# Patient Record
Sex: FEMALE | Race: NATIVE HAWAIIAN OR OTHER PACIFIC ISLANDER
[De-identification: names, ages, dates, MRNs, and addresses within clinical notes are randomized per-mention and may not be internally consistent; named-entity substitution may affect disease eponyms.]

---

## 2020-09-14 ENCOUNTER — HOSPITAL ENCOUNTER (OUTPATIENT)
Dept: HOSPITAL 67 - LAB | Age: 70
Discharge: HOME | End: 2020-09-14
Attending: NURSE PRACTITIONER
Payer: COMMERCIAL

## 2020-09-14 DIAGNOSIS — Z11.59: Primary | ICD-10-CM

## 2020-09-14 DIAGNOSIS — R05: ICD-10-CM

## 2020-09-14 DIAGNOSIS — J02.9: ICD-10-CM

## 2020-09-14 DIAGNOSIS — R53.83: ICD-10-CM

## 2020-09-14 PROCEDURE — 87635 SARS-COV-2 COVID-19 AMP PRB: CPT

## 2020-09-14 PROCEDURE — U0003 INFECTIOUS AGENT DETECTION BY NUCLEIC ACID (DNA OR RNA); SEVERE ACUTE RESPIRATORY SYNDROME CORONAVIRUS 2 (SARS-COV-2) (CORONAVIRUS DISEASE [COVID-19]), AMPLIFIED PROBE TECHNIQUE, MAKING USE OF HIGH THROUGHPUT TECHNOLOGIES AS DESCRIBED BY CMS-2020-01-R: HCPCS

## 2020-09-14 PROCEDURE — G2023 SPECIMEN COLLECT COVID-19: HCPCS

## 2021-02-22 ENCOUNTER — HOSPITAL ENCOUNTER (OUTPATIENT)
Dept: HOSPITAL 67 - MED/SURG | Age: 71
Setting detail: OBSERVATION
LOS: 3 days | Discharge: HOME | End: 2021-02-25
Attending: INTERNAL MEDICINE | Admitting: INTERNAL MEDICINE
Payer: COMMERCIAL

## 2021-02-22 VITALS
HEIGHT: 64 IN | BODY MASS INDEX: 24.11 KG/M2 | BODY MASS INDEX: 24.11 KG/M2 | WEIGHT: 141.25 LBS | HEIGHT: 64 IN | WEIGHT: 141.25 LBS

## 2021-02-22 VITALS — DIASTOLIC BLOOD PRESSURE: 109 MMHG | TEMPERATURE: 98 F | SYSTOLIC BLOOD PRESSURE: 213 MMHG

## 2021-02-22 VITALS — DIASTOLIC BLOOD PRESSURE: 82 MMHG | SYSTOLIC BLOOD PRESSURE: 176 MMHG | TEMPERATURE: 99.5 F

## 2021-02-22 VITALS — TEMPERATURE: 98.3 F | DIASTOLIC BLOOD PRESSURE: 98 MMHG | SYSTOLIC BLOOD PRESSURE: 192 MMHG

## 2021-02-22 DIAGNOSIS — F41.8: ICD-10-CM

## 2021-02-22 DIAGNOSIS — C55: ICD-10-CM

## 2021-02-22 DIAGNOSIS — K56.41: Primary | ICD-10-CM

## 2021-02-22 DIAGNOSIS — R10.84: ICD-10-CM

## 2021-02-22 DIAGNOSIS — G25.81: ICD-10-CM

## 2021-02-22 DIAGNOSIS — E11.9: ICD-10-CM

## 2021-02-22 DIAGNOSIS — R11.2: ICD-10-CM

## 2021-02-22 LAB
APTT BLD: 23.2 SECONDS (ref 24.5–33.6)
PLATELET # BLD: 108 K/UL (ref 152–353)
POTASSIUM SERPL-SCNC: 3 MMOL/L (ref 3.6–5.2)
SODIUM SERPL-SCNC: 136 MMOL/L (ref 136–145)

## 2021-02-22 PROCEDURE — 83690 ASSAY OF LIPASE: CPT

## 2021-02-22 PROCEDURE — 96366 THER/PROPH/DIAG IV INF ADDON: CPT

## 2021-02-22 PROCEDURE — 85027 COMPLETE CBC AUTOMATED: CPT

## 2021-02-22 PROCEDURE — 82948 REAGENT STRIP/BLOOD GLUCOSE: CPT

## 2021-02-22 PROCEDURE — 80048 BASIC METABOLIC PNL TOTAL CA: CPT

## 2021-02-22 PROCEDURE — 81000 URINALYSIS NONAUTO W/SCOPE: CPT

## 2021-02-22 PROCEDURE — 96372 THER/PROPH/DIAG INJ SC/IM: CPT

## 2021-02-22 PROCEDURE — U0003 INFECTIOUS AGENT DETECTION BY NUCLEIC ACID (DNA OR RNA); SEVERE ACUTE RESPIRATORY SYNDROME CORONAVIRUS 2 (SARS-COV-2) (CORONAVIRUS DISEASE [COVID-19]), AMPLIFIED PROBE TECHNIQUE, MAKING USE OF HIGH THROUGHPUT TECHNOLOGIES AS DESCRIBED BY CMS-2020-01-R: HCPCS

## 2021-02-22 PROCEDURE — G0378 HOSPITAL OBSERVATION PER HR: HCPCS

## 2021-02-22 PROCEDURE — 96367 TX/PROPH/DG ADDL SEQ IV INF: CPT

## 2021-02-22 PROCEDURE — 80053 COMPREHEN METABOLIC PANEL: CPT

## 2021-02-22 PROCEDURE — 85610 PROTHROMBIN TIME: CPT

## 2021-02-22 PROCEDURE — 36591 DRAW BLOOD OFF VENOUS DEVICE: CPT

## 2021-02-22 PROCEDURE — 96375 TX/PRO/DX INJ NEW DRUG ADDON: CPT

## 2021-02-22 PROCEDURE — G0379 DIRECT REFER HOSPITAL OBSERV: HCPCS

## 2021-02-22 PROCEDURE — 99220: CPT

## 2021-02-22 PROCEDURE — 87635 SARS-COV-2 COVID-19 AMP PRB: CPT

## 2021-02-22 PROCEDURE — 96374 THER/PROPH/DIAG INJ IV PUSH: CPT

## 2021-02-22 PROCEDURE — 85730 THROMBOPLASTIN TIME PARTIAL: CPT

## 2021-02-22 PROCEDURE — 96365 THER/PROPH/DIAG IV INF INIT: CPT

## 2021-02-22 PROCEDURE — 36415 COLL VENOUS BLD VENIPUNCTURE: CPT

## 2021-02-22 PROCEDURE — 96360 HYDRATION IV INFUSION INIT: CPT

## 2021-02-23 VITALS — DIASTOLIC BLOOD PRESSURE: 60 MMHG | SYSTOLIC BLOOD PRESSURE: 109 MMHG | TEMPERATURE: 98.7 F

## 2021-02-23 VITALS — DIASTOLIC BLOOD PRESSURE: 60 MMHG | TEMPERATURE: 98.6 F | SYSTOLIC BLOOD PRESSURE: 132 MMHG

## 2021-02-23 VITALS — DIASTOLIC BLOOD PRESSURE: 65 MMHG | SYSTOLIC BLOOD PRESSURE: 146 MMHG | TEMPERATURE: 98.3 F

## 2021-02-23 VITALS — TEMPERATURE: 98.8 F | DIASTOLIC BLOOD PRESSURE: 60 MMHG | SYSTOLIC BLOOD PRESSURE: 130 MMHG

## 2021-02-23 VITALS — DIASTOLIC BLOOD PRESSURE: 73 MMHG | TEMPERATURE: 98.4 F | SYSTOLIC BLOOD PRESSURE: 147 MMHG

## 2021-02-23 VITALS — DIASTOLIC BLOOD PRESSURE: 60 MMHG | TEMPERATURE: 98.7 F | SYSTOLIC BLOOD PRESSURE: 124 MMHG

## 2021-02-23 LAB
PLATELET # BLD: 89 K/UL (ref 152–353)
POTASSIUM SERPL-SCNC: 3.6 MMOL/L (ref 3.6–5.2)
SODIUM SERPL-SCNC: 140 MMOL/L (ref 136–145)

## 2021-02-24 VITALS — DIASTOLIC BLOOD PRESSURE: 91 MMHG | SYSTOLIC BLOOD PRESSURE: 188 MMHG | TEMPERATURE: 98.7 F

## 2021-02-24 VITALS — DIASTOLIC BLOOD PRESSURE: 70 MMHG | SYSTOLIC BLOOD PRESSURE: 135 MMHG | TEMPERATURE: 97.7 F

## 2021-02-24 VITALS — SYSTOLIC BLOOD PRESSURE: 186 MMHG | DIASTOLIC BLOOD PRESSURE: 82 MMHG | TEMPERATURE: 98.1 F

## 2021-02-24 VITALS — DIASTOLIC BLOOD PRESSURE: 66 MMHG | SYSTOLIC BLOOD PRESSURE: 129 MMHG | TEMPERATURE: 98.4 F

## 2021-02-24 VITALS — TEMPERATURE: 98.1 F | SYSTOLIC BLOOD PRESSURE: 118 MMHG | DIASTOLIC BLOOD PRESSURE: 55 MMHG

## 2021-02-24 LAB
PLATELET # BLD: 75 K/UL (ref 152–353)
POTASSIUM SERPL-SCNC: 3.3 MMOL/L (ref 3.6–5.2)
SODIUM SERPL-SCNC: 139 MMOL/L (ref 136–145)

## 2021-02-25 VITALS — TEMPERATURE: 98.1 F | DIASTOLIC BLOOD PRESSURE: 63 MMHG | SYSTOLIC BLOOD PRESSURE: 137 MMHG

## 2021-02-25 VITALS — DIASTOLIC BLOOD PRESSURE: 85 MMHG | SYSTOLIC BLOOD PRESSURE: 177 MMHG | TEMPERATURE: 98.3 F

## 2021-02-25 VITALS — DIASTOLIC BLOOD PRESSURE: 65 MMHG | TEMPERATURE: 98.2 F | SYSTOLIC BLOOD PRESSURE: 132 MMHG

## 2021-02-25 VITALS — SYSTOLIC BLOOD PRESSURE: 117 MMHG | DIASTOLIC BLOOD PRESSURE: 59 MMHG | TEMPERATURE: 97.9 F

## 2021-02-25 LAB
PLATELET # BLD: 76 K/UL (ref 152–353)
POTASSIUM SERPL-SCNC: 2.9 MMOL/L (ref 3.6–5.2)
SODIUM SERPL-SCNC: 140 MMOL/L (ref 136–145)

## 2021-02-25 NOTE — NUR
[birth year]1950: NOTIFIED ER DOCTOR OF BLOOD SUGAR, 356, POTASSIUM,3.0, AND
/98. ORDER GIVEN FOR 4 UNITS REG INSULIN SQ. 10 MEQ POTASSIUM IV, AND TO
DO A LIPASE. ORDERS WRITTEN.
2/22/21 2105: /111 NOTIFIED ER DOCTOR. ORDER RECEIVED FOR LABETALOL 20
MG IV. ORDER WRITTEN.
2/23/21 2200: PT WAS ATTEMPTING TO DRINK MAG CITRATE AND BECAME VERY NAUSEATED
AND HAD PAIN IN STOMACH. ZOFRAN 4 MG AND MORPHINE 4 MG GIVEN IV.
2/23/21 2230: PT STATED SHE FEELS BETTERBUT DOES NOT WANT TO DRINK ANY MORE
MAG CITRATE. PT WANTS TO WAIT ON ENEMA UNTIL SHE " RESTS UP." INSTRUCTED PT TO
CALL WHEN SHE IS READY. PT VERBALIZES UNDERSTANDING.
DAMION GONZALEZ CALLED REGARDING PT PAIN MEDICATION, HE STATES THEY REQUESTED
PAIN MEDICATION X1 HOUR AGO AND IT HAS NOT BEEN GIVEN YET, HE STATES IF IT IS
NOT GIVEN IN THE NEXT FEW MIN HE WILL CALL JOSHUA JACKSON, I ASSURED HIM THAT
PAIN MEDICATION WILL BE GIVEN NOW, BRE ROCA WAS NOTFIED AND PAIN MEDICATION
IS GIVEN AT THIS TIME
EDUCATED PT ON DISCHARGE TEACHING, PT AND SPOUSE VERBALIZED UNDERSTANDING. NEW
WRITTEN PRESCRIPTION WAS GIVEN TO PT ALONG WITH DISCHARGE INSTRUCTIONS. PORT A
CATH WAS UNACCESSED BY THIS WRITER WITH THE ASSISTANCE OF JOE ALTMAN RN.
SOFIA NEEDLE WAS STILL INTACT UPON REMOVAL. NAD WAS NOTED DURING EDUCATION
AND REMOVAL OF THE SOFIA NEEDLE.
ENTERED PATIENT'S ROOM AT THIS TIME. PATIENT STANDING AT SINK WASHING HER
HANDS. PT STATES SHE HAD A BOWEL MOVEMENT. SHE STATES THAT SHE FEELS MUCH
BETTER NOW THAT SHE WAS ABLE TO GO. PATIENT ASSISTED BACK INTO BED. SHE DENIES
ANY PAIN OR NAUSEA AT THIS TIME, BUT SHE STATES THAT SHE DOES FEEL WEAK AND
TIRED NOW.  ENTERED ROOM AT THIS TIME. VITAL SIGNS AND BLOOD SUGAR
TAKEN. BLOOD SUGAR . PATIENT STATES THAT SHE HAD NOT EATEN MUCH TODAY,
SO INSULIN WAS HELD AND PATIENT INFORMED. PATIENT AND  REQUESTING
MORPHINE AND SOMETHING FOR NAUSEA IN ORDER TO HELP PATIENT RESTING TONIGHT.
MORPHINE AND PHENERGAN GIVEN PER MD ORDERS. PATIENT RESTING QUIETLY IN BED.
NAD NOTED. CALL LIGHT WITHIN REACH AND  AT BEDSIDE.
ENTERED PATIENT'S ROOM. PATIENT STATES THAT SHE FEELS MUCH BETTER. STATES,
"YALL HAVE CURED ME." SHE DENIES ANY PAIN OR NAUSEA. BED LOCKED AND IN LOWEST
POSITION. CALL LIGHT WITHIN EASY REACH.
ENTERED PT'S ROOM TO ADMINISTER A DOSE OF DOCULAX. PT WANTED TO GO TO THE
BATHROOM, UPON STANDING THERE WAS BOWEL ON THE CHUX PADS IN THE BED. AFTER
CLEANING PT AND GETTING HER BACK INTO BED I CALLED DR JUNIOR. DR JUNIOR STATED
THAT WE WOULD MONITOR THE PT FOR ANY MORE BOWEL MOVEMENTS BEFORE WE GAVE
ANOTHER ENEMA.
ENTERED PT'S ROOM, PT IS SNORING WHILE SLEEPING. PT'S ENEMA IS SCHEDULED FOR
1400, I AM GOING TO HOLD THE ENEMA UNTIL THE PT WAKES.
HOME MED REC COMPLETED.
IN TO CHECK ON PATIENT AND OBTAIN VITAL SIGNS. PATIENT RESTING QUIETLY IN BED
WITH EYES CLOSED. RESPIRATIONS EVEN AND UNLABORED.  EVEN COMMENTED ON
HOW WELL THE PATIENT HAS SLEPT SO FAR. VITAL SIGNS OBTAINED. NO OTHER CONCERNS
OR COMPLAINTS VOICED AT THIS TIME. CALL LIGHT WITHIN EASY REACH.
PATIENT ARRIVED TO FLOOR VIA DIRECT ADMIT VIA WHEELCHAIR. PATIENT IN BED. NAD
NOTED. PATIENT BELONGINGS WITH . ORIENTED TO ROOM.
PATIENT RESTING QUIETLY IN BED WITH EYES CLOSED. NS INFUSING @ 125ML/HR WITH
NO ISSUES. THIS WRITER ASKED HER IF SHE NEEDED ANYTHING FOR PAIN OR NAUSEA.
SHE STATES THAT SHE IS FINE. SHE DOES NOT NEED ANYTHING AT THIS TIME. SHE
REPORTS ONE BOWEL MOVEMENT LAST NIGHT AND ONE EARLY THIS MORNING. NO OTHER
CONCERNS OR COMPLAINTS VOICED AT THIS TIME. CALL LIGHT WITHIN REACH.
PATIENT RESTING QUIETLY IN BED.  STEPPED OUT OF ROOM AT THIS TIME.
PATIENT'S RESPIRATIONS ARE EVEN AND UNLABORED. NS INFUSING @ 125 INTO PORT TO
LEFT CHEST. NO TENDERNESS OR EDEMA NOTED. CALL LIGHT WITHIN EASY REACH. NAD
NOTED.
PT RECIEVED A FLEET ENEMA BY MIRTA KENT RN WHILE I ASSISTED. PT PASSED A
FEW PEICES OF SEMI FORMED STOOL INTO THE BEDSIDE COMMODE. PT STATED THAT SHE
FELT MUCH BETTER IN HER UPPER ABDOMEN. PT IS NOW BACK IN THE BED WITH CHUX
PADS COVERING THE BED, RESTING. NAD IS NOTED AT THIS TIME.
PT RESTING COMFORTABLY AT THIS TIME. STATES "I'M FEELING BETTER". NO VOMITING
THIS SHIFT. MEDICATED PT ONCE FOR PAIN AND NAUSEA. PT HAS SLEPT MOST OF THIS
SHIFT AND HAS NOT BEEN ABLE TO DRINK CONTRAST.
PT WAS DISCHARGED FROM THE HOSPITAL VIA WHEELCHAIR WITH  BY SIDE
CARRYING PERSONAL BELONGINGS. PT WAS PLACED INSIDE PERSONAL VEHICLE ALONG WITH
PERSONAL BELONGINGS. NAD WAS NOTED DURING DISCHARGE FROM THE HOSPITAL.
PT WAS GIVEN FLEETS ENEMA. PT HAD BOWEL MOVEMENT BUT COMPLAINED OF BEING
NAUSEATED. PT MOANING AND SHAKING AND HAS DAMP WASHCLOTH OVER FACE. PHENERGAN
12.5 IN 50ML OF NS GIVEN IV PIGGYBACK.
[email or web address]

## 2021-03-03 ENCOUNTER — HOSPITAL ENCOUNTER (OUTPATIENT)
Dept: HOSPITAL 67 - LAB | Age: 71
Discharge: HOME | End: 2021-03-03
Attending: INTERNAL MEDICINE
Payer: COMMERCIAL

## 2021-03-03 DIAGNOSIS — I10: ICD-10-CM

## 2021-03-03 DIAGNOSIS — D64.89: ICD-10-CM

## 2021-03-03 DIAGNOSIS — E87.6: ICD-10-CM

## 2021-03-03 DIAGNOSIS — C55: ICD-10-CM

## 2021-03-03 DIAGNOSIS — E11.9: Primary | ICD-10-CM

## 2021-03-03 LAB
PLATELET # BLD: 133 K/UL (ref 152–353)
POTASSIUM SERPL-SCNC: 3.7 MMOL/L (ref 3.6–5.2)
SODIUM SERPL-SCNC: 140 MMOL/L (ref 136–145)

## 2021-03-03 PROCEDURE — 83036 HEMOGLOBIN GLYCOSYLATED A1C: CPT

## 2021-03-03 PROCEDURE — 85027 COMPLETE CBC AUTOMATED: CPT

## 2021-03-03 PROCEDURE — 83735 ASSAY OF MAGNESIUM: CPT

## 2021-03-03 PROCEDURE — 80053 COMPREHEN METABOLIC PANEL: CPT
